# Patient Record
Sex: FEMALE
[De-identification: names, ages, dates, MRNs, and addresses within clinical notes are randomized per-mention and may not be internally consistent; named-entity substitution may affect disease eponyms.]

---

## 2024-03-19 PROBLEM — Z00.00 ENCOUNTER FOR PREVENTIVE HEALTH EXAMINATION: Status: ACTIVE | Noted: 2024-03-19

## 2024-03-26 ENCOUNTER — NON-APPOINTMENT (OUTPATIENT)
Age: 54
End: 2024-03-26

## 2024-04-05 ENCOUNTER — APPOINTMENT (OUTPATIENT)
Dept: BREAST CENTER | Facility: CLINIC | Age: 54
End: 2024-04-05
Payer: COMMERCIAL

## 2024-04-05 VITALS
DIASTOLIC BLOOD PRESSURE: 65 MMHG | BODY MASS INDEX: 28.66 KG/M2 | SYSTOLIC BLOOD PRESSURE: 123 MMHG | WEIGHT: 146 LBS | HEART RATE: 60 BPM | HEIGHT: 60 IN

## 2024-04-05 DIAGNOSIS — N60.92 UNSPECIFIED BENIGN MAMMARY DYSPLASIA OF LEFT BREAST: ICD-10-CM

## 2024-04-05 PROCEDURE — 99204 OFFICE O/P NEW MOD 45 MIN: CPT

## 2024-04-05 NOTE — REVIEW OF SYSTEMS
[Negative] : Heme/Lymph Consent (Spinal Accessory)/Introductory Paragraph: The rationale for Mohs was explained to the patient and consent was obtained. The risks, benefits and alternatives to therapy were discussed in detail. Specifically, the risks of damage to the spinal accessory nerve, infection, scarring, bleeding, prolonged wound healing, incomplete removal, allergy to anesthesia, and recurrence were addressed. Prior to the procedure, the treatment site was clearly identified and confirmed by the patient. All components of Universal Protocol/PAUSE Rule completed.

## 2024-04-05 NOTE — ASSESSMENT
[FreeTextEntry1] : 53-year-old female with left breast atypical ductal hyperplasia as well as mucocele like lesion and an area of calcifications spanning 3 cm

## 2024-04-05 NOTE — DATA REVIEWED
[FreeTextEntry1] : 1/3/2023 (R) bilateral screening mammogram and ultrasound: Extremely dense. Benign findings, no evidence of malignancy. BI-RADS 2  1/25/2024 (R) bilateral screening mammogram and ultrasound: Extremely dense. Interval increase in edema (?) microcalcifications with area of distortion in the left breast. Additional imaging is recommended. No evidence of malignancy in the right breast. Diagnostic left mammogram is recommended for targeted ultrasound. BI-RADS 0  2/29/2024 (R) left diagnostic mammogram and ultrasound: Heterogeneously dense. Left breast architectural distortion associated with the marking clip at 9:00 has increased in prominence compared to prior examinations is unusual for history of benign biopsy and is suspicious. Stereotactic biopsy is recommended. Probably benign sonographic findings in the left breast at 8-9 o'clock as above. Short-term follow-up targeted left breast ultrasound in 6 months is recommended pending pathology of the above recommended biopsy. BI-RADS 4  3/7/2024 (R) Left breast stereotactic biopsy: Atypical ductal hyperplasia with microcalcifications. Mucocele-like lesion with microcalcifications. Fibrocystic changes with calcifications. Pathology results indicate that the specimen is high risk. The pathology results are concordant with the imaging. Surgical excision is recommended. Consultation with a breast surgeon is recommended for further management of these high risk lesions. Please note calcifications are seen associated with this area of distortion and span approximately 3 cm on 2/29/2024 magnification view. Wide surgical excision is recommended.

## 2024-04-05 NOTE — HISTORY OF PRESENT ILLNESS
[FreeTextEntry1] : 53-year-old female presents for initial consultation for left breast atypical ductal hyperplasia and mucocele like lesion. The patient presented in January 2024 for routine screening mammo/ultrasound which identified an area of increasing microcalcifications with distortion in the left breast LIQ posterior.  A left DX mammogram and US imaging was completed on 2/29/2024 and confirmed the architectural distortion which was associated with a biopsy clip at 9:00 in the left breast. The finding was felt to be suspicious so biopsy was recommended & completed, which returned as ADH and mucolele like lesion. Of note the calcifications that were seen in the area of distortion span approximately 3 cm, and wide surgical excision was recommended by the radiologist. Additionally there are other probably benign findings in the left breast for which 6-month follow-up ultrasound was recommended. We discussed the implications of atypical hyperplasia as well as mucocele like lesions at length in the office today. We discussed that upon surgical excision 5% to 10% of cases are upgraded on final pathology. The current recommended treatment options based on NCCN guidelines are close monitoring with 6-month follow-up breast imaging versus surgical excision.  There is a small risk of progression of disease with 6-month follow-up breast imaging.  However, recent literature has shown that the survival options for both choices are equivalent.  We discussed that atypical hyperplasia is not a pre-cancerous or cancerous condition but is an indication for increased breast cancer risk.   Risks, benefits and alternatives were discussed including but not limited to bleeding, infection, and need for additional procedures. Following our discussion, the patient has decided to proceed with left breast lumpectomy with preoperative localization and specimen x-ray.  I did explain to the patient that since there are 3 cm of calcifications that are of concern, I recommended needle localization bracketing of all those calcifications.  CADEN CASTILLO verbalizes their understanding of the procedure and the risks/benefits/complications and alternatives were discussed questions were answered. CADEN knows to call me if they have any additional questions or concerns.  Patient has a history of a left breast biopsy in the past that was benign.  SHABBIR lifetime risk is 43.7%; Denies family history of breast or ovarian cancer

## 2024-04-05 NOTE — PLAN
[TextEntry] : Patient to go to the OR for left breast needle localization bracketing lumpectomy on 5/15/2024 Medical clearance needed Consent signed and scanned to chart Patient to follow-up 7 to 10 days after surgery completed

## 2024-04-05 NOTE — PAST MEDICAL HISTORY
[Perimenopausal] : The patient is perimenopausal [Menarche Age ____] : age at menarche was [unfilled] [Irregular Cycle Intervals] : are  irregular [Total Preg ___] : G[unfilled] [Live Births ___] : P[unfilled]  [Age At Live Birth ___] : Age at live birth: [unfilled] [Definite ___ (Date)] : the last menstrual period was [unfilled] [FreeTextEntry4] : EVERY 6 MONTHS  [FreeTextEntry5] : TUBAL LIGATION [FreeTextEntry6] : NONE [FreeTextEntry7] : ORAL CONTRACEPTIVES TIMES 10 YEARS [FreeTextEntry8] : YES, 4 MONTHS WITH FIRST CHILD, 6 MONTHS WITH SECOND CHILD,

## 2024-06-26 ENCOUNTER — NON-APPOINTMENT (OUTPATIENT)
Age: 54
End: 2024-06-26

## 2024-07-02 ENCOUNTER — RESULT REVIEW (OUTPATIENT)
Age: 54
End: 2024-07-02

## 2024-07-02 ENCOUNTER — APPOINTMENT (OUTPATIENT)
Dept: MAMMOGRAPHY | Facility: CLINIC | Age: 54
End: 2024-07-02
Payer: COMMERCIAL

## 2024-07-02 ENCOUNTER — OUTPATIENT (OUTPATIENT)
Dept: OUTPATIENT SERVICES | Facility: HOSPITAL | Age: 54
LOS: 1 days | End: 2024-07-02

## 2024-07-02 PROCEDURE — 19282 PERQ DEVICE BREAST EA IMAG: CPT | Mod: LT

## 2024-07-02 PROCEDURE — 19281 PERQ DEVICE BREAST 1ST IMAG: CPT | Mod: LT

## 2024-07-03 ENCOUNTER — RESULT REVIEW (OUTPATIENT)
Age: 54
End: 2024-07-03

## 2024-07-03 ENCOUNTER — APPOINTMENT (OUTPATIENT)
Age: 54
End: 2024-07-03
Payer: COMMERCIAL

## 2024-07-03 PROCEDURE — 19126 EXCISION ADDL BREAST LESION: CPT | Mod: LT

## 2024-07-03 PROCEDURE — 14000 TIS TRNFR TRUNK 10 SQ CM/<: CPT | Mod: LT

## 2024-07-03 PROCEDURE — 88307 TISSUE EXAM BY PATHOLOGIST: CPT | Mod: 26

## 2024-07-03 PROCEDURE — 19125 EXCISION BREAST LESION: CPT | Mod: LT

## 2024-07-03 PROCEDURE — 76098 X-RAY EXAM SURGICAL SPECIMEN: CPT | Mod: 26

## 2024-07-09 LAB — SURGICAL PATHOLOGY STUDY: SIGNIFICANT CHANGE UP

## 2024-07-11 ENCOUNTER — APPOINTMENT (OUTPATIENT)
Dept: BREAST CENTER | Facility: CLINIC | Age: 54
End: 2024-07-11
Payer: COMMERCIAL

## 2024-07-11 VITALS
HEART RATE: 74 BPM | HEIGHT: 60 IN | SYSTOLIC BLOOD PRESSURE: 112 MMHG | WEIGHT: 147 LBS | BODY MASS INDEX: 28.86 KG/M2 | DIASTOLIC BLOOD PRESSURE: 71 MMHG

## 2024-07-11 DIAGNOSIS — R92.30 DENSE BREASTS, UNSPECIFIED: ICD-10-CM

## 2024-07-11 DIAGNOSIS — N60.92 UNSPECIFIED BENIGN MAMMARY DYSPLASIA OF LEFT BREAST: ICD-10-CM

## 2024-07-11 DIAGNOSIS — Z98.890 OTHER SPECIFIED POSTPROCEDURAL STATES: ICD-10-CM

## 2024-07-11 PROCEDURE — 99024 POSTOP FOLLOW-UP VISIT: CPT

## 2025-02-04 ENCOUNTER — NON-APPOINTMENT (OUTPATIENT)
Age: 55
End: 2025-02-04

## 2025-02-07 ENCOUNTER — APPOINTMENT (OUTPATIENT)
Dept: BREAST CENTER | Facility: CLINIC | Age: 55
End: 2025-02-07

## 2025-03-31 DIAGNOSIS — R92.8 OTHER ABNORMAL AND INCONCLUSIVE FINDINGS ON DIAGNOSTIC IMAGING OF BREAST: ICD-10-CM

## 2025-04-01 ENCOUNTER — NON-APPOINTMENT (OUTPATIENT)
Age: 55
End: 2025-04-01

## 2025-04-03 ENCOUNTER — NON-APPOINTMENT (OUTPATIENT)
Age: 55
End: 2025-04-03

## 2025-04-07 ENCOUNTER — APPOINTMENT (OUTPATIENT)
Dept: BREAST CENTER | Facility: CLINIC | Age: 55
End: 2025-04-07

## 2025-09-11 ENCOUNTER — NON-APPOINTMENT (OUTPATIENT)
Age: 55
End: 2025-09-11

## 2025-09-11 ENCOUNTER — APPOINTMENT (OUTPATIENT)
Dept: BREAST CENTER | Facility: CLINIC | Age: 55
End: 2025-09-11
Payer: COMMERCIAL

## 2025-09-11 VITALS
HEART RATE: 69 BPM | OXYGEN SATURATION: 98 % | BODY MASS INDEX: 28.51 KG/M2 | SYSTOLIC BLOOD PRESSURE: 117 MMHG | WEIGHT: 146 LBS | DIASTOLIC BLOOD PRESSURE: 70 MMHG

## 2025-09-11 DIAGNOSIS — R92.30 DENSE BREASTS, UNSPECIFIED: ICD-10-CM

## 2025-09-11 DIAGNOSIS — N60.92 UNSPECIFIED BENIGN MAMMARY DYSPLASIA OF LEFT BREAST: ICD-10-CM

## 2025-09-11 DIAGNOSIS — R92.8 OTHER ABNORMAL AND INCONCLUSIVE FINDINGS ON DIAGNOSTIC IMAGING OF BREAST: ICD-10-CM

## 2025-09-11 DIAGNOSIS — Z98.890 OTHER SPECIFIED POSTPROCEDURAL STATES: ICD-10-CM

## 2025-09-11 DIAGNOSIS — Z12.39 ENCOUNTER FOR OTHER SCREENING FOR MALIGNANT NEOPLASM OF BREAST: ICD-10-CM

## 2025-09-11 PROCEDURE — 99213 OFFICE O/P EST LOW 20 MIN: CPT
